# Patient Record
Sex: FEMALE | Race: WHITE | NOT HISPANIC OR LATINO | Employment: OTHER | URBAN - NONMETROPOLITAN AREA
[De-identification: names, ages, dates, MRNs, and addresses within clinical notes are randomized per-mention and may not be internally consistent; named-entity substitution may affect disease eponyms.]

---

## 2017-10-31 ENCOUNTER — HOSPITAL ENCOUNTER (EMERGENCY)
Facility: HOSPITAL | Age: 52
Discharge: ANOTHER HOSPITAL | End: 2017-11-01
Attending: EMERGENCY MEDICINE | Admitting: EMERGENCY MEDICINE

## 2017-10-31 DIAGNOSIS — R20.2 PARESTHESIA OF BOTH LEGS: Primary | ICD-10-CM

## 2017-10-31 LAB
BASOPHILS # BLD AUTO: 0.03 10*3/MM3 (ref 0–0.2)
BASOPHILS NFR BLD AUTO: 0.2 % (ref 0–2)
DEPRECATED RDW RBC AUTO: 42.4 FL (ref 40–54)
EOSINOPHIL # BLD AUTO: 0.26 10*3/MM3 (ref 0–0.7)
EOSINOPHIL NFR BLD AUTO: 2.1 % (ref 0–4)
ERYTHROCYTE [DISTWIDTH] IN BLOOD BY AUTOMATED COUNT: 13.5 % (ref 12–15)
HCT VFR BLD AUTO: 45.7 % (ref 37–47)
HGB BLD-MCNC: 15.9 G/DL (ref 12–16)
IMM GRANULOCYTES # BLD: 0.05 10*3/MM3 (ref 0–0.03)
IMM GRANULOCYTES NFR BLD: 0.4 % (ref 0–5)
LYMPHOCYTES # BLD AUTO: 4.11 10*3/MM3 (ref 0.72–4.86)
LYMPHOCYTES NFR BLD AUTO: 32.8 % (ref 15–45)
MCH RBC QN AUTO: 30.1 PG (ref 28–32)
MCHC RBC AUTO-ENTMCNC: 34.8 G/DL (ref 33–36)
MCV RBC AUTO: 86.6 FL (ref 82–98)
MONOCYTES # BLD AUTO: 0.62 10*3/MM3 (ref 0.19–1.3)
MONOCYTES NFR BLD AUTO: 4.9 % (ref 4–12)
NEUTROPHILS # BLD AUTO: 7.47 10*3/MM3 (ref 1.87–8.4)
NEUTROPHILS NFR BLD AUTO: 59.6 % (ref 39–78)
PLATELET # BLD AUTO: 204 10*3/MM3 (ref 130–400)
PMV BLD AUTO: 10.9 FL (ref 6–12)
RBC # BLD AUTO: 5.28 10*6/MM3 (ref 4.2–5.4)
WBC NRBC COR # BLD: 12.54 10*3/MM3 (ref 4.8–10.8)

## 2017-10-31 PROCEDURE — 51702 INSERT TEMP BLADDER CATH: CPT

## 2017-10-31 PROCEDURE — 85025 COMPLETE CBC W/AUTO DIFF WBC: CPT | Performed by: EMERGENCY MEDICINE

## 2017-10-31 PROCEDURE — 99285 EMERGENCY DEPT VISIT HI MDM: CPT

## 2017-10-31 PROCEDURE — 80053 COMPREHEN METABOLIC PANEL: CPT | Performed by: EMERGENCY MEDICINE

## 2017-10-31 PROCEDURE — 51798 US URINE CAPACITY MEASURE: CPT

## 2017-10-31 RX ORDER — OXYCODONE AND ACETAMINOPHEN 10; 325 MG/1; MG/1
1 TABLET ORAL EVERY 6 HOURS PRN
COMMUNITY

## 2017-10-31 RX ORDER — FLUTICASONE PROPIONATE 50 MCG
2 SPRAY, SUSPENSION (ML) NASAL DAILY
COMMUNITY

## 2017-10-31 RX ORDER — CYCLOBENZAPRINE HCL 10 MG
10 TABLET ORAL 3 TIMES DAILY PRN
COMMUNITY

## 2017-10-31 RX ORDER — PROMETHAZINE HYDROCHLORIDE 25 MG/1
25 TABLET ORAL EVERY 6 HOURS PRN
COMMUNITY

## 2017-10-31 RX ORDER — SODIUM CHLORIDE 0.9 % (FLUSH) 0.9 %
10 SYRINGE (ML) INJECTION AS NEEDED
Status: DISCONTINUED | OUTPATIENT
Start: 2017-10-31 | End: 2017-11-01 | Stop reason: HOSPADM

## 2017-10-31 RX ORDER — PREGABALIN 75 MG/1
75 CAPSULE ORAL 2 TIMES DAILY
COMMUNITY

## 2017-11-01 ENCOUNTER — APPOINTMENT (OUTPATIENT)
Dept: CT IMAGING | Facility: HOSPITAL | Age: 52
End: 2017-11-01

## 2017-11-01 VITALS
HEIGHT: 71 IN | TEMPERATURE: 97.8 F | SYSTOLIC BLOOD PRESSURE: 113 MMHG | RESPIRATION RATE: 16 BRPM | HEART RATE: 87 BPM | DIASTOLIC BLOOD PRESSURE: 69 MMHG | OXYGEN SATURATION: 100 % | BODY MASS INDEX: 27.16 KG/M2 | WEIGHT: 194 LBS

## 2017-11-01 LAB
ALBUMIN SERPL-MCNC: 4.5 G/DL (ref 3.5–5)
ALBUMIN/GLOB SERPL: 1.3 G/DL (ref 1.1–2.5)
ALP SERPL-CCNC: 140 U/L (ref 24–120)
ALT SERPL W P-5'-P-CCNC: 15 U/L (ref 0–54)
ANION GAP SERPL CALCULATED.3IONS-SCNC: 16 MMOL/L (ref 4–13)
AST SERPL-CCNC: 17 U/L (ref 7–45)
BACTERIA UR QL AUTO: ABNORMAL /HPF
BILIRUB SERPL-MCNC: 0.3 MG/DL (ref 0.1–1)
BILIRUB UR QL STRIP: NEGATIVE
BUN BLD-MCNC: 14 MG/DL (ref 5–21)
BUN/CREAT SERPL: 18.2 (ref 7–25)
CALCIUM SPEC-SCNC: 9.8 MG/DL (ref 8.4–10.4)
CHLORIDE SERPL-SCNC: 106 MMOL/L (ref 98–110)
CLARITY UR: ABNORMAL
CO2 SERPL-SCNC: 24 MMOL/L (ref 24–31)
COLOR UR: YELLOW
CREAT BLD-MCNC: 0.77 MG/DL (ref 0.5–1.4)
GFR SERPL CREATININE-BSD FRML MDRD: 79 ML/MIN/1.73
GLOBULIN UR ELPH-MCNC: 3.6 GM/DL
GLUCOSE BLD-MCNC: 186 MG/DL (ref 70–100)
GLUCOSE UR STRIP-MCNC: NEGATIVE MG/DL
HGB UR QL STRIP.AUTO: NEGATIVE
HYALINE CASTS UR QL AUTO: ABNORMAL /LPF
KETONES UR QL STRIP: NEGATIVE
LEUKOCYTE ESTERASE UR QL STRIP.AUTO: ABNORMAL
NITRITE UR QL STRIP: NEGATIVE
PH UR STRIP.AUTO: 5.5 [PH] (ref 5–8)
POTASSIUM BLD-SCNC: 3.8 MMOL/L (ref 3.5–5.3)
PROT SERPL-MCNC: 8.1 G/DL (ref 6.3–8.7)
PROT UR QL STRIP: NEGATIVE
RBC # UR: ABNORMAL /HPF
REF LAB TEST METHOD: ABNORMAL
SODIUM BLD-SCNC: 146 MMOL/L (ref 135–145)
SP GR UR STRIP: 1.02 (ref 1–1.03)
SQUAMOUS #/AREA URNS HPF: ABNORMAL /HPF
UROBILINOGEN UR QL STRIP: ABNORMAL
WBC UR QL AUTO: ABNORMAL /HPF

## 2017-11-01 PROCEDURE — 72131 CT LUMBAR SPINE W/O DYE: CPT

## 2017-11-01 PROCEDURE — 81001 URINALYSIS AUTO W/SCOPE: CPT | Performed by: EMERGENCY MEDICINE

## 2017-11-01 RX ORDER — OXYCODONE AND ACETAMINOPHEN 7.5; 325 MG/1; MG/1
1 TABLET ORAL ONCE
Status: COMPLETED | OUTPATIENT
Start: 2017-11-01 | End: 2017-11-01

## 2017-11-01 RX ADMIN — OXYCODONE HYDROCHLORIDE AND ACETAMINOPHEN 1 TABLET: 7.5; 325 TABLET ORAL at 05:37

## 2017-11-01 NOTE — ED NOTES
CT ADVISES THAT THE MRI CANNOT BE PERFORMED UNLESS THE PHYSICIAN WHO PLACED THE PAIN PUMP IS AT BEDSIDE.      Jhony Larry RN  10/31/17 7531

## 2017-11-01 NOTE — ED PROVIDER NOTES
Subjective   HPI Comments: This is a 55-year-old female presents to our facility with a complaint of paresthesias and numbness and weakness to the right lower extremity.  She states that she has chronic back pain, chronic back issues in the cervical and lumbar area.  She has a pain pump that is placed and her physicians are in [place] in [facility].  She states that the symptoms have been ongoing for the last couple of days and more progressively she has noticed today.     There is no bowel or urine incontinence.  She has not voided urine for the last 6 hours or so.  She has no fever no chills no abdominal pain no chest pain no shortness of breath.          Patient is a 52 y.o. female presenting with neurologic complaint.   History provided by:  Patient  History limited by:  Acuity of condition and age   used: No    Neurologic Problem   The patient's primary symptoms include focal weakness. This is a new problem. The current episode started in the past 7 days. The neurological problem developed gradually. The problem is unchanged. There was lower extremity focality noted. Associated symptoms include back pain. Pertinent negatives include no abdominal pain, auditory change, aura, bladder incontinence, bowel incontinence, chest pain, confusion, diaphoresis, fatigue, fever, headaches, light-headedness, neck pain or palpitations. Past treatments include nothing. The treatment provided no relief.       Review of Systems   Constitutional: Positive for activity change. Negative for diaphoresis, fatigue and fever.   HENT: Negative.    Eyes: Negative.    Respiratory: Negative.    Cardiovascular: Negative.  Negative for chest pain and palpitations.   Gastrointestinal: Negative.  Negative for abdominal pain and bowel incontinence.   Endocrine: Negative.    Genitourinary: Negative.  Negative for bladder incontinence.   Musculoskeletal: Positive for back pain. Negative for neck pain.   Skin:  Negative.    Allergic/Immunologic: Negative.    Neurological: Positive for focal weakness and numbness. Negative for light-headedness and headaches.   Hematological: Negative.    Psychiatric/Behavioral: Negative.  Negative for confusion.   All other systems reviewed and are negative.      History reviewed. No pertinent past medical history.    Allergies   Allergen Reactions   • Adhesive Tape        Past Surgical History:   Procedure Laterality Date   • ABDOMINAL SURGERY     • ADENOIDECTOMY     • BACK SURGERY     • CHOLECYSTECTOMY     • DENTAL PROCEDURE     • HYSTERECTOMY     • NECK SURGERY         History reviewed. No pertinent family history.    Social History     Social History   • Marital status: Single     Spouse name: N/A   • Number of children: N/A   • Years of education: N/A     Social History Main Topics   • Smoking status: Current Every Day Smoker     Packs/day: 1.50     Types: Cigarettes   • Smokeless tobacco: None   • Alcohol use Yes      Comment: OCC   • Drug use: Defer   • Sexual activity: Defer     Other Topics Concern   • None     Social History Narrative           Objective   Physical Exam   Constitutional: She is oriented to person, place, and time. She appears well-developed and well-nourished.   HENT:   Head: Normocephalic and atraumatic.   Right Ear: External ear normal.   Left Ear: External ear normal.   Nose: Nose normal.   Mouth/Throat: Oropharynx is clear and moist.   Eyes: Conjunctivae and EOM are normal. Pupils are equal, round, and reactive to light.   Neck: Normal range of motion. Neck supple. No JVD present. No tracheal deviation present. No thyromegaly present.   Cardiovascular: Normal rate, regular rhythm and normal heart sounds.    Pulmonary/Chest: Effort normal and breath sounds normal.   Abdominal: Soft. Bowel sounds are normal.   Musculoskeletal: Normal range of motion.   Lymphadenopathy:     She has no cervical adenopathy.   Neurological: She is alert and oriented to person,  place, and time. She has normal reflexes.   Weakness in the right lower extremity she is unable to lift her leg at all she is able to move her toes and sensory she states that she is not able to feel light touch.  Plantars are upgoing.   Skin: Skin is warm and dry.   Psychiatric: She has a normal mood and affect. Her behavior is normal. Judgment and thought content normal.   Nursing note and vitals reviewed.      Procedures         ED Course  ED Course   Comment By Time   Retention of the urine with about 7 mL we identified this with a bladder scan placed a Franco.  She is complaining of weakness now on the left side of leg as well right and left leg weakness.  There is no bowel incontinence.  At this time I do not feel giving her steroids would be of any significant benefit as a CT study of the lumbar area is negative for an acute pathology of the cord. Brianna MUNGUIA MD 11/01 0301   Her physicians are located in the [facility] at [facility] I talked to [facility] in [place] for a transfer and admission to the hospital Sunday observation. Brianna MUNGUIA MD 11/01 0301   D/W Nohemi Mckeon NP for Dr. Patricio MUNGUIA MD 11/01 0303         Genevieve Rubi #[id number]  (52 y.o. F) KADEEM             Lab Results           Urinalysis With / Culture If Indicated - Urine, Clean Catch (Final result)    Abnormal Component (Lab Inquiry)       Collection Time Result Time COLOR U CLARITY U PH, UR SPECGRAV U GLUCOSE U     11/01/17 01:42:00 11/01/17 01:54:00 Yellow Cloudy (A) 5.5 1.016 Negative          Collection Time Result Time KETONES U BILIRUBIN, UR BLOOD U PROTEIN U LEUKOCYTUR     11/01/17 01:42:00 11/01/17 01:54:00 Negative Negative Negative Negative Trace (A)          Collection Time Result Time NITRITE U URBLNGN UA     11/01/17 01:42:00 11/01/17 01:54:00 Negative 1.0 E.U./dL                      Urinalysis, Microscopic Only - Urine, Clean Catch (Final result)    Abnormal Component (Lab  Inquiry)       Collection Time Result Time RBC  UA WBC UA BACTERIA SQAMEPI UA HYAL     11/01/17 01:42:00 11/01/17 01:54:00 0-2 (A) 0-2 (A) None Seen 0-2 3-6          Collection Time Result Time Methodolog     11/01/17 01:42:00 11/01/17 01:54:00 Automated Microscopy                      CBC & Differential (Final result) Result time: 10/31/17 23:49:26     Final result     Narrative:     The following orders were created for panel order CBC & Differential.  Procedure                               Abnormality         Status                     ---------                               -----------         ------                     CBC Auto Differential[[id number]]        Abnormal            Final result                 Please view results for these tests on the individual orders.               Comprehensive Metabolic Panel (Final result)    Abnormal Component (Lab Inquiry)       Collection Time Result Time GLU BUN CREATININE NA K     10/31/17 23:40:00 11/01/17 00:01:00 186 (H) 14 0.77 146 (H) 3.8          Collection Time Result Time CL CO2 CALCIUM PROTEIN ALB     10/31/17 23:40:00 11/01/17 00:01:00 106 24.0 9.8 8.1 4.50          Collection Time Result Time ALT AST ALK PHOS BILI Total EGFRIFNONA     10/31/17 23:40:00 11/01/17 00:01:00 15 17 140 (H) 0.3 79          Collection Time Result Time GLOB A/G Ratio BCR ANION GAP     10/31/17 23:40:00 11/01/17 00:01:00 3.6 1.3 18.2 16.0 (H)                      CBC Auto Differential (Final result)    Abnormal Component (Lab Inquiry)       Collection Time Result Time WBC ADJ RBC HGB HCT MCV     10/31/17 23:40:00 10/31/17 23:49:00 12.54 (H) 5.28 15.9 45.7 86.6          Collection Time Result Time MCH MCHC RDW RDWSD MPV     10/31/17 23:40:00 10/31/17 23:49:00 30.1 34.8 13.5 42.4 10.9          Collection Time Result Time PLT NEUTRO PCT LYMPHO PCT MONO PCT EOS PCT     10/31/17 23:40:00 10/31/17 23:49:00 204 59.6 32.8 4.9 2.1          Collection Time Result Time BASOS PCT AUTO IG% NEUTRO  ABS LYMPHS ABS MONOS ABS     10/31/17 23:40:00 10/31/17 23:49:00 0.2 0.4 7.47 4.11 0.62          Collection Time Result Time EOS ABS BASOS ABS AUTO IG#     10/31/17 23:40:00 10/31/17 23:49:00 0.26 0.03 0.05 (H)                   Imaging Results           CT Lumbar Spine Without Contrast (Final result) Result time: 11/01/17 07:19:25     Final result by Stephanie Parikh MD (11/01/17 07:19:25)     Impression:     1.  No acute osseous posttraumatic changes and  2.  Postoperative changes, with no evidence of hardware loosening or  failure.  3. Multilevel degenerative change, with moderate spinal canal stenosis  at L3-L4.  This report was finalized on 11/01/2017 07:19 by Dr. Stephanie Parikh MD.     Narrative:     EXAMINATION: CT LUMBAR SPINE WITHOUT IV CONTRAST 11/01/2017     COMPARISON: None.     HISTORY: Female, 52 years-old. Right leg numbness     TECHNIQUE: Multiple CT images were obtained of the lumbar spine without  IV contrast. The images were formatted in the axial, coronal and  sagittal planes.     Radiation dose equals  mGy-cm.  Automated exposure control dose  reduction technique was implemented.     FINDINGS:      Preservation of the normal lordosis of the lumbar spine.No evidence of  acute fracture. Evidence of prior L4-5 and L5-S1 laminectomies and  fusion posteriorly. Interbody fusion of those levels as well. Multilevel  degenerative changes, with disc disease and facet arthropathy. There is  a spinal canal catheter in place. Moderate spinal canal stenosis at  L3-L4. .The prevertebral and paraspinal soft tissues are otherwise  unremarkable.                     SCANNED - IMAGING (Final result) Result time: 11/01/17 13:58:22       ECG Results      None               MDM  Number of Diagnoses or Management Options  Paresthesia of both legs:      Amount and/or Complexity of Data Reviewed  Clinical lab tests: reviewed and ordered  Tests in the radiology section of CPT®: reviewed and ordered  Discussion of  test results with the performing providers: yes  Decide to obtain previous medical records or to obtain history from someone other than the patient: yes  Obtain history from someone other than the patient: yes  Discuss the patient with other providers: yes  Independent visualization of images, tracings, or specimens: yes    Risk of Complications, Morbidity, and/or Mortality  Presenting problems: moderate  Diagnostic procedures: moderate  Management options: moderate    Critical Care  Total time providing critical care: 30-74 minutes    Patient Progress  Patient progress: stable      Final diagnoses:   Paresthesia of both legs            Brianna MUNGUIA MD  11/08/17 0242

## 2017-11-01 NOTE — ED NOTES
PT REPORTS THE INABILITY TO ''FEEL HER LOWER EXTREMITIES AND INVOLUNTARY MUSCLE SPASMS THAT SHOOT UP HER LEG HAD ENOUGH TO CAUSE PAIN IN HER LOWER BACK.  NEW ORDERS RECEIVED FROM MD FRYE.      Jhony Larry RN  11/01/17 0531

## 2017-11-01 NOTE — ED NOTES
[facility] NOTIFIED FOR TRANSPORT TO [facility].  PT WILL AWAIT FOR SHIFT CHANGE BEFORE TRANSPORT.      Jhony Larry, RN  11/01/17 0312

## 2017-11-01 NOTE — ED NOTES
Pt conts to c/o right leg numbness and tingling.  Pt reports to nurse that she cannot fel her right leg  & unable to move.      Zoë Cuadra RN  11/01/17 5123

## 2019-08-30 NOTE — ED NOTES
"Pt calls out for nurse reporting soa.  Pt reports to nurse that she feels like she needs \"to cough up something.  I can feel it in my throat.\"  Pt advised that SPO2 good.  NAD noted.  Lungs cta.  Pt notified that EMS should be here soon for transport.     Zoë Cuadra RN  11/01/17 0841       Zoë Cuadra RN  11/01/17 0844    " hand grasp, leg strength strong and equal bilaterally